# Patient Record
Sex: FEMALE | Race: BLACK OR AFRICAN AMERICAN | NOT HISPANIC OR LATINO | Employment: OTHER | URBAN - METROPOLITAN AREA
[De-identification: names, ages, dates, MRNs, and addresses within clinical notes are randomized per-mention and may not be internally consistent; named-entity substitution may affect disease eponyms.]

---

## 2018-02-11 PROBLEM — J18.9 PNEUMONIA DUE TO INFECTIOUS ORGANISM: Status: ACTIVE | Noted: 2018-02-11

## 2018-02-12 PROBLEM — E66.01 SEVERE OBESITY (BMI >= 40): Status: ACTIVE | Noted: 2018-02-12

## 2018-02-12 PROBLEM — G47.33 OBSTRUCTIVE SLEEP APNEA: Status: ACTIVE | Noted: 2018-02-12

## 2018-02-12 PROBLEM — J45.21 MILD INTERMITTENT ASTHMA WITH ACUTE EXACERBATION: Status: ACTIVE | Noted: 2018-02-12

## 2018-02-12 PROBLEM — I10 ESSENTIAL HYPERTENSION: Status: ACTIVE | Noted: 2018-02-12

## 2018-02-13 PROBLEM — J18.9 PNEUMONIA OF BOTH LUNGS DUE TO INFECTIOUS ORGANISM: Status: RESOLVED | Noted: 2018-02-11 | Resolved: 2018-02-13

## 2020-01-15 ENCOUNTER — NURSE TRIAGE (OUTPATIENT)
Dept: ADMINISTRATIVE | Facility: CLINIC | Age: 45
End: 2020-01-15

## 2020-01-15 NOTE — TELEPHONE ENCOUNTER
Pt calling with reports of new onset positional dyspnea, abnormal diaphoresis, and coughing. Denies CP. Per triage protocol, pt advised to go to ED now. 911 precautions given. Pt verbalized understanding.    Reason for Disposition   Patient sounds very sick or weak to the triager    Additional Information   Negative: Breathing stopped and hasn't returned   Negative: Choking on something   Negative: SEVERE difficulty breathing (e.g., struggling for each breath, speaks in single words, pulse > 120)   Negative: Bluish (or gray) lips or face   Negative: Difficult to awaken or acting confused (e.g., disoriented, slurred speech)   Negative: Passed out (i.e., fainted, collapsed and was not responding)   Negative: Wheezing started suddenly after medicine, an allergic food, or bee sting   Negative: Stridor   Negative: Slow, shallow and weak breathing   Negative: Sounds like a life-threatening emergency to the triager    Protocols used: BREATHING DIFFICULTY-A-OH

## 2020-01-28 ENCOUNTER — OFFICE VISIT (OUTPATIENT)
Dept: PRIMARY CARE CLINIC | Facility: CLINIC | Age: 45
End: 2020-01-28
Payer: MEDICAID

## 2020-01-28 ENCOUNTER — CLINICAL SUPPORT (OUTPATIENT)
Dept: PRIMARY CARE CLINIC | Facility: CLINIC | Age: 45
End: 2020-01-28
Payer: MEDICAID

## 2020-01-28 VITALS
BODY MASS INDEX: 53.92 KG/M2 | HEIGHT: 62 IN | DIASTOLIC BLOOD PRESSURE: 102 MMHG | HEART RATE: 82 BPM | WEIGHT: 293 LBS | OXYGEN SATURATION: 99 % | TEMPERATURE: 100 F | SYSTOLIC BLOOD PRESSURE: 150 MMHG | RESPIRATION RATE: 20 BRPM

## 2020-01-28 DIAGNOSIS — I10 ESSENTIAL HYPERTENSION: ICD-10-CM

## 2020-01-28 DIAGNOSIS — Z13.6 SCREENING FOR CARDIOVASCULAR CONDITION: ICD-10-CM

## 2020-01-28 DIAGNOSIS — F32.A DEPRESSION, UNSPECIFIED DEPRESSION TYPE: ICD-10-CM

## 2020-01-28 DIAGNOSIS — Z09 HOSPITAL DISCHARGE FOLLOW-UP: Primary | ICD-10-CM

## 2020-01-28 DIAGNOSIS — R35.0 FREQUENT URINATION: ICD-10-CM

## 2020-01-28 DIAGNOSIS — E66.01 MORBID OBESITY WITH BMI OF 60.0-69.9, ADULT: ICD-10-CM

## 2020-01-28 DIAGNOSIS — Z23 NEED FOR VACCINATION: ICD-10-CM

## 2020-01-28 DIAGNOSIS — G47.33 OSA (OBSTRUCTIVE SLEEP APNEA): ICD-10-CM

## 2020-01-28 DIAGNOSIS — Z21 ASYMPTOMATIC HIV INFECTION: ICD-10-CM

## 2020-01-28 DIAGNOSIS — Z12.31 BREAST CANCER SCREENING BY MAMMOGRAM: ICD-10-CM

## 2020-01-28 PROBLEM — B20 HIV (HUMAN IMMUNODEFICIENCY VIRUS INFECTION): Status: ACTIVE | Noted: 2020-01-28

## 2020-01-28 LAB
ALBUMIN SERPL BCP-MCNC: 3.5 G/DL (ref 3.5–5.2)
ALBUMIN/CREAT UR: 2126.7 UG/MG (ref 0–30)
ALP SERPL-CCNC: 58 U/L (ref 38–126)
ALT SERPL W/O P-5'-P-CCNC: 20 U/L (ref 14–54)
ANION GAP SERPL CALC-SCNC: 10 MMOL/L (ref 8–16)
AST SERPL-CCNC: 22 U/L (ref 15–41)
BACTERIA #/AREA URNS HPF: ABNORMAL /HPF
BILIRUB SERPL-MCNC: 0.4 MG/DL (ref 0.3–1.2)
BILIRUB UR QL STRIP: NEGATIVE
BUN SERPL-MCNC: 18 MG/DL (ref 6–20)
CALCIUM SERPL-MCNC: 9 MG/DL (ref 8.6–10)
CHLORIDE SERPL-SCNC: 101 MMOL/L (ref 101–111)
CHOLEST SERPL-MCNC: 222 MG/DL (ref 80–200)
CHOLEST/HDLC SERPL: 5.3 {RATIO} (ref 2–5)
CLARITY UR: CLEAR
CO2 SERPL-SCNC: 26 MMOL/L (ref 23–29)
COLOR UR: YELLOW
CREAT SERPL-MCNC: 1.4 MG/DL (ref 0.5–1.4)
CREAT UR-MCNC: 116 MG/DL (ref 15–325)
EST. GFR  (AFRICAN AMERICAN): 52.3 ML/MIN/1.73 M^2
EST. GFR  (NON AFRICAN AMERICAN): 45.4 ML/MIN/1.73 M^2
ESTIMATED AVG GLUCOSE: 123 MG/DL (ref 68–131)
GLUCOSE SERPL-MCNC: 81 MG/DL (ref 74–118)
GLUCOSE UR QL STRIP: NEGATIVE
HBA1C MFR BLD HPLC: 5.9 % (ref 4–5.6)
HDLC SERPL-MCNC: 42 MG/DL (ref 40–75)
HDLC SERPL: 18.9 % (ref 20–50)
HGB UR QL STRIP: ABNORMAL
HYALINE CASTS #/AREA URNS LPF: 0 /LPF
KETONES UR QL STRIP: NEGATIVE
LDLC SERPL CALC-MCNC: 138 MG/DL
LEUKOCYTE ESTERASE UR QL STRIP: NEGATIVE
MICROALBUMIN UR DL<=1MG/L-MCNC: 2467 UG/ML
MICROSCOPIC COMMENT: ABNORMAL
NITRITE UR QL STRIP: NEGATIVE
NONHDLC SERPL-MCNC: 180 MG/DL
PH UR STRIP: 6 [PH] (ref 5–8)
POTASSIUM SERPL-SCNC: 4 MMOL/L (ref 3.5–5.1)
PROT SERPL-MCNC: 8.1 G/DL (ref 6–8.4)
PROT UR QL STRIP: ABNORMAL
RBC #/AREA URNS HPF: 0 /HPF (ref 0–4)
SODIUM SERPL-SCNC: 137 MMOL/L (ref 136–145)
SP GR UR STRIP: 1.02 (ref 1–1.03)
SQUAMOUS #/AREA URNS HPF: 2 /HPF
TRIGL SERPL-MCNC: 212 MG/DL (ref 30–150)
URN SPEC COLLECT METH UR: ABNORMAL
UROBILINOGEN UR STRIP-ACNC: NEGATIVE EU/DL
WBC #/AREA URNS HPF: 6 /HPF (ref 0–5)

## 2020-01-28 PROCEDURE — 99999 PR PBB SHADOW E&M-EST. PATIENT-LVL IV: ICD-10-PCS | Mod: PBBFAC,,, | Performed by: FAMILY MEDICINE

## 2020-01-28 PROCEDURE — 80053 COMPREHEN METABOLIC PANEL: CPT

## 2020-01-28 PROCEDURE — 99204 PR OFFICE/OUTPT VISIT, NEW, LEVL IV, 45-59 MIN: ICD-10-PCS | Mod: S$PBB,25,, | Performed by: FAMILY MEDICINE

## 2020-01-28 PROCEDURE — 81001 URINALYSIS AUTO W/SCOPE: CPT

## 2020-01-28 PROCEDURE — 86704 HEP B CORE ANTIBODY TOTAL: CPT

## 2020-01-28 PROCEDURE — 82043 UR ALBUMIN QUANTITATIVE: CPT

## 2020-01-28 PROCEDURE — 80074 ACUTE HEPATITIS PANEL: CPT

## 2020-01-28 PROCEDURE — 86592 SYPHILIS TEST NON-TREP QUAL: CPT

## 2020-01-28 PROCEDURE — 83036 HEMOGLOBIN GLYCOSYLATED A1C: CPT

## 2020-01-28 PROCEDURE — 36415 COLL VENOUS BLD VENIPUNCTURE: CPT | Mod: PBBFAC,PN

## 2020-01-28 PROCEDURE — 36415 COLL VENOUS BLD VENIPUNCTURE: CPT

## 2020-01-28 PROCEDURE — 87491 CHLMYD TRACH DNA AMP PROBE: CPT

## 2020-01-28 PROCEDURE — 99214 OFFICE O/P EST MOD 30 MIN: CPT | Mod: PBBFAC,PN,25 | Performed by: FAMILY MEDICINE

## 2020-01-28 PROCEDURE — 90471 IMMUNIZATION ADMIN: CPT | Mod: PBBFAC,PN

## 2020-01-28 PROCEDURE — 80061 LIPID PANEL: CPT

## 2020-01-28 PROCEDURE — 99204 OFFICE O/P NEW MOD 45 MIN: CPT | Mod: S$PBB,25,, | Performed by: FAMILY MEDICINE

## 2020-01-28 PROCEDURE — 99999 PR PBB SHADOW E&M-EST. PATIENT-LVL IV: CPT | Mod: PBBFAC,,, | Performed by: FAMILY MEDICINE

## 2020-01-28 RX ORDER — SERTRALINE HYDROCHLORIDE 50 MG/1
50 TABLET, FILM COATED ORAL DAILY
Qty: 30 TABLET | Refills: 2 | Status: SHIPPED | OUTPATIENT
Start: 2020-01-28 | End: 2020-02-05 | Stop reason: SDUPTHER

## 2020-01-28 RX ORDER — LISINOPRIL 20 MG/1
20 TABLET ORAL DAILY
Qty: 30 TABLET | Refills: 2 | Status: SHIPPED | OUTPATIENT
Start: 2020-01-28 | End: 2020-02-27

## 2020-01-28 NOTE — PROGRESS NOTES
Patient identified by name and date of birth, denies any allergies, immunization administered as ordered by aseptic technique, tolerated well by pt.

## 2020-01-28 NOTE — PROGRESS NOTES
"Subjective:       Patient ID: Celine Peñaloza is a 44 y.o. female.    Chief Complaint: Hospital Follow Up (ER visit a couple weeks ago, told had pneumonia, here for f/u and to establish care)    44 yr old with HIV, HTN, GANGA(not using CPAP), morbid obesity depression. Here to establish care as needs a pcp. Goes to [facility] for HIV care last labs done in 11/2019 and undetectable per pt. Disabled and lives with mother who she is primary caretaker of along with two grandchildren.   Was taking lisinopril but did not have anyone to fill it so has not taken it in some time.     Review of Systems   Constitutional: Negative for activity change, chills and fever.   Respiratory: Negative for cough, chest tightness, shortness of breath and wheezing.    Cardiovascular: Negative for chest pain, palpitations and leg swelling.   Gastrointestinal: Negative for abdominal distention, abdominal pain, constipation, diarrhea, nausea and vomiting.   Genitourinary: Negative for difficulty urinating and dysuria.   Skin: Negative for rash.   Neurological: Negative for weakness.   Hematological: Does not bruise/bleed easily.   Psychiatric/Behavioral: Negative for agitation. The patient is nervous/anxious.        Objective:      Vitals:    01/28/20 0914   BP: (!) 150/102   BP Location: Right arm   Patient Position: Sitting   BP Method: Thigh Cuff (Manual)   Pulse: 82   Resp: 20   Temp: 99.7 °F (37.6 °C)   TempSrc: Oral   SpO2: 99%   Weight: (!) 166.9 kg (367 lb 15.2 oz)   Height: 5' 2" (1.575 m)     Physical Exam   Constitutional: She appears well-developed and well-nourished.   HENT:   Head: Normocephalic and atraumatic.   Nose: Nose normal.   Mouth/Throat: Oropharynx is clear and moist.   Eyes: Conjunctivae and EOM are normal.   Cardiovascular: Normal rate, regular rhythm and normal heart sounds.   Pulmonary/Chest: Effort normal and breath sounds normal. No respiratory distress. She has no wheezes. She has no rales.   Abdominal: Soft. She " exhibits no distension. There is no tenderness.   Lymphadenopathy:     She has no cervical adenopathy.   Neurological: She is alert. No cranial nerve deficit.   Psychiatric: She has a normal mood and affect.   Nursing note and vitals reviewed.          Lab Results   Component Value Date     (L) 01/15/2020    K 3.8 01/15/2020     01/15/2020    CO2 24 01/15/2020    BUN 24 (H) 01/15/2020    CREATININE 1.7 (H) 01/15/2020    ANIONGAP 10 01/15/2020     Lab Results   Component Value Date    HGBA1C 5.6 02/13/2018     Lab Results   Component Value Date    BNP 32 01/15/2020     (H) 09/07/2019     (H) 02/11/2018       Lab Results   Component Value Date    WBC 13.50 (H) 01/15/2020    HGB 12.0 01/15/2020    HCT 36.2 (L) 01/15/2020    HCT 36 02/11/2018     01/15/2020    GRAN 8.9 (H) 01/15/2020    GRAN 66.1 01/15/2020     No results found for: CHOL, HDL, LDLCALC, TRIG       Current Outpatient Medications:     dolutegravir (TIVICAY) 50 mg Tab, Take 50 mg by mouth., Disp: , Rfl:     emtricitabine-tenofovir alafen (DESCOVY) 200-25 mg Tab, Take 1 tablet by mouth., Disp: , Rfl:     albuterol (PROVENTIL/VENTOLIN HFA) 90 mcg/actuation inhaler, Inhale 1-2 puffs into the lungs every 6 (six) hours as needed for Wheezing. Rescue (Patient not taking: Reported on 1/28/2020), Disp: 1 Inhaler, Rfl: 0    lisinopril (PRINIVIL,ZESTRIL) 20 MG tablet, Take 1 tablet (20 mg total) by mouth once daily., Disp: 30 tablet, Rfl: 2    sertraline (ZOLOFT) 50 MG tablet, Take 1 tablet (50 mg total) by mouth once daily., Disp: 30 tablet, Rfl: 2        Assessment:       1. Hospital discharge follow-up    2. Essential hypertension    3. Asymptomatic HIV infection    4. Morbid obesity with BMI of 60.0-69.9, adult    5. Frequent urination    6. GANGA (obstructive sleep apnea)    7. Screening for cardiovascular condition    8. Need for vaccination    9. Breast cancer screening by mammogram    10. Depression, unspecified depression  type           Plan:       Essential hypertension  -     lisinopril (PRINIVIL,ZESTRIL) 20 MG tablet; Take 1 tablet (20 mg total) by mouth once daily.  Dispense: 30 tablet; Refill: 2  -     Comprehensive metabolic panel; Future; Expected date: 01/28/2020  -     Microalbumin/creatinine urine ratio  Uncontrolled 150/102. Denies SOB, CP, HA, or vision changes. Previously controlled on lisinopril 20 mg but not taking now. Refill today.   F/u in 1 week.     Asymptomatic HIV infection  -     C. trachomatis/N. gonorrhoeae by AMP DNA [facility]; Urine  -     Hepatitis B core antibody, total; Future; Expected date: 01/28/2020  -     Hepatitis B surface antigen; Future; Expected date: 01/28/2020  -     RPR; Future; Expected date: 01/28/2020  -     Hepatitis panel, acute; Future; Expected date: 01/28/2020  Getting HIV [facility] at [facility]. Requesting med records. Per pt undetectable.     Morbid obesity with BMI of 60.0-69.9, adult  -     Ambulatory Referral to Diabetes Education; Future; Expected date: 01/28/2020  Reviewed diet and lifestyle changes. Discussed target goals, including cutting out sugar beverages and unhealthy snacks. Planning for healthy mealy prep. Goal of exercising at least 20 min a day. Vigorous exercise. Referred to nutrition.   Frequent urination  -     Urinalysis, Reflex to Urine Culture Urine, Clean Catch    GANGA (obstructive sleep apnea)  -     Polysomnography 4 or more parameters; Future  Previously diagnosed but not using CPAP. Old testing was 10 yrs ago and unsure of where. Also needs new equipment.     Screening for cardiovascular condition  -     Hemoglobin A1c; Future; Expected date: 01/28/2020  -     Lipid panel; Future; Expected date: 01/28/2020    Need for vaccination  -     Tdap Vaccine    Breast cancer screening by mammogram  -     Mammo Digital Screening Bilat without CA; Future; Expected date: 01/28/2020  Low risk. Referral per pt request. Had previous test 3 yrs ago which was normal per pt.        Depression: Long standing. Ups and downs. Trial of sertraline 50 mg qhs.

## 2020-01-29 LAB
C TRACH DNA SPEC QL NAA+PROBE: NOT DETECTED
HAV IGM SERPL QL IA: NEGATIVE
HBV CORE AB SERPL QL IA: NEGATIVE
HBV CORE IGM SERPL QL IA: NEGATIVE
HBV SURFACE AG SERPL QL IA: NEGATIVE
HBV SURFACE AG SERPL QL IA: NEGATIVE
HCV AB SERPL QL IA: POSITIVE
N GONORRHOEA DNA SPEC QL NAA+PROBE: NOT DETECTED
RPR SER QL: NORMAL

## 2020-01-30 ENCOUNTER — TELEPHONE (OUTPATIENT)
Dept: PRIMARY CARE CLINIC | Facility: CLINIC | Age: 45
End: 2020-01-30

## 2020-01-30 NOTE — TELEPHONE ENCOUNTER
Unable to reach patient about Hepatitis C antibody positive results. Left message about upcoming appointment and need to discuss lab results. To call clinic with questions.

## 2020-02-02 ENCOUNTER — PATIENT OUTREACH (OUTPATIENT)
Dept: ADMINISTRATIVE | Facility: OTHER | Age: 45
End: 2020-02-02

## 2020-02-02 ENCOUNTER — TELEPHONE (OUTPATIENT)
Dept: PRIMARY CARE CLINIC | Facility: CLINIC | Age: 45
End: 2020-02-02

## 2020-02-02 DIAGNOSIS — R76.8 HEPATITIS C ANTIBODY POSITIVE IN BLOOD: Primary | ICD-10-CM

## 2020-02-02 NOTE — TELEPHONE ENCOUNTER
Called patient about lab results, including her positive hepatitis C antibody and the need for further testing. She is feeling well currently and advised to make her upcoming appointment for more testing. She says she plans to come in and grateful for the call.

## 2020-02-03 ENCOUNTER — CLINICAL SUPPORT (OUTPATIENT)
Dept: DIABETES | Facility: CLINIC | Age: 45
End: 2020-02-03
Payer: MEDICAID

## 2020-02-03 DIAGNOSIS — E66.01 MORBID OBESITY WITH BMI OF 60.0-69.9, ADULT: ICD-10-CM

## 2020-02-03 PROCEDURE — 99999 PR PBB SHADOW E&M-EST. PATIENT-LVL II: ICD-10-PCS | Mod: PBBFAC,,, | Performed by: DIETITIAN, REGISTERED

## 2020-02-03 PROCEDURE — 97802 MEDICAL NUTRITION INDIV IN: CPT | Mod: PBBFAC,PN | Performed by: DIETITIAN, REGISTERED

## 2020-02-03 PROCEDURE — 99212 OFFICE O/P EST SF 10 MIN: CPT | Mod: PBBFAC,PN | Performed by: DIETITIAN, REGISTERED

## 2020-02-03 PROCEDURE — 99999 PR PBB SHADOW E&M-EST. PATIENT-LVL II: CPT | Mod: PBBFAC,,, | Performed by: DIETITIAN, REGISTERED

## 2020-02-03 NOTE — PROGRESS NOTES
PCP: Jamarcus Levy MD  REFERRING PROVIDER:  Jamarcus Levy MD      HISTORY OF PRESENT ILLNESS:  44 y.o. female patient is in clinic today for bariatric surgery assessment. Patient has 6 month(s) of MSD.     Patient denies polydipsia, polyuria, polyphagia, blurred vision, nausea, vomiting, diarrhea and hypoglycemia. Weight difficulties for 10 years.  Weight loss strategies include dieting.    VITAL SIGNS:  There were no vitals filed for this visit.  IBW: 110 lbs lbs +/-10% and AdjIBW: 174.5 lbs/79.3kg    ALLERGIES & MEDICATIONS: Reviewed and Reconciled  MEDICAL/SURGICAL & FAMILY HISTORY: Reviewed and Reconciled    LABORATORY:  A1C:   Hemoglobin A1C   Date Value Ref Range Status   01/28/2020 5.9 (H) 4.0 - 5.6 % Final     Comment:     ADA Screening Guidelines:  5.7-6.4%  Consistent with prediabetes  >or=6.5%  Consistent with diabetes  High levels of fetal hemoglobin interfere with the HbA1C  assay. Heterozygous hemoglobin variants (HbS, HgC, etc)do  not significantly interfere with this assay.   However, presence of multiple variants may affect accuracy.       Chol:   Cholesterol   Date Value Ref Range Status   01/28/2020 222 (H) 80 - 200 mg/dL Final     Comment:     The National Cholesterol Education Program (NCEP) has set the  following guidelines (reference ranges) for Cholesterol:  Optimal.....................<200 mg/dL  Borderline High.............200-239 mg/dL  High........................> or = 240 mg/dL       Trig:   Triglycerides   Date Value Ref Range Status   01/28/2020 212 (H) 30 - 150 mg/dL Final     Comment:     The National Cholesterol Education Program (NCEP) has set the  following guidelines (reference values) for triglycerides:  Normal......................<150 mg/dL  Borderline High.............150-199 mg/dL  High........................200-499 mg/dL       HDL:   HDL   Date Value Ref Range Status   01/28/2020 42 40 - 75 mg/dL Final     Comment:     The National Cholesterol Education  Program (NCEP) has set the  following guidelines (reference values) for HDL Cholesterol:  Low...............<40 mg/dL  Optimal...........>60 mg/dL       LDL: No components found for: LDL   BUN:      BUN, Bld   Date Value Ref Range Status   01/28/2020 18 6 - 20 mg/dL Final     AST:    AST   Date Value Ref Range Status   01/28/2020 22 15 - 41 U/L Final         ALT:    ALT   Date Value Ref Range Status   01/28/2020 20 14 - 54 U/L Final     Micro/Cr Ratio:    Creatinine   Date Value Ref Range Status   01/28/2020 1.4 0.5 - 1.4 mg/dL Final       ACTIVITY LEVEL: Aerobic none. Resistance none.    NUTRITION INTAKE: Meal patterns include 3 meals, 1-2 snacks daily with intake 2000 cals/d. Patient is not limiting carbohydrates, saturated fat or sodium. Inadequate intakes of fruits, vegetables, whole grains.  B - 2 boiled eggs and gatorade zero  S - none  L - 2 boiled eggs  S - none  D - grilled chicken  S - none  Beverages - water and Gatorade zero  DIning out - rarely    PSYCHOSOCIAL: Stage of change - preparation  Barriers to change - none  Motivation to change (10high): 10  Predicted compliance (10high): 10  Realistic expectation for wt loss (10high): 10  Verbalizes understanding of dietary changes post procedure and willingness to participate in physical activity (10high): 10    MNT ASSESSMENT:   1700 calories, 60-75 grams protein daily  30 grams carb/meal, 15 grams carb/snack  increase fruit 2 serv/d, vegetables 2+ cups/d, whole grains 3+serv/d, lowfat dairy 3+serv/d  low-fat, low-sodium  150 min physical activity per week, moderate intensity, as tolerated    Nutrition Diagnosis:  Nutrition Problem  Obesity Class III    Related to (etiology):   Excessive energy intake  Sedentary Lifestyle    Signs and Symptoms (as evidenced by):   Current BMI 67.3  Patient reports weight loss with current meal plan, was >400# now weighing 166.9kg     Interventions/Recommendations (treatment strategy):  Nutrition counseling on weight loss    Nutrition education to build basic and essential nutrition related knowledge of obesity and weight loss    Nutrition Diagnosis Status:   New    .Education provided:   Discussed protein sources like eggs, low-fat cottage cheese, greek yogurt, sliced deli turkey, low-fat sliced cheese, protein drinks and protein bars, foods to avoid/limit such as starchy carbohydrates (bread, rice, pasta, potatoes, corn, grits, oatmeal, etc), planning to have 4-6 small meals a day rather than 3 large meals, measure portion sizes, use smaller bowls and plates, limit eating out to once a week and make low fat, low carbohydrate choices, include fruits and vegetables in daily meal plan, avoid/limit candy and desserts, low fat options (baked, broiled, grilled, and boiled instead of fried, sauteed, creamed), increase physical activity, chew food thoroughly before swallowing, sip beverages don't chug or gulp, no liquids 30 minutes before, during and 30 minutes after meals  Reviewed pre-op liquid diet, protein supplement suggestions, sugar free clear liquids allowed in unlimited amounts, progression of diet after Bariatric surgery, Phase 1 liquid (high protein liquids for 1-2 weeks after surgery), phase 2 pureed (for the next 2 weeks phase 1 items and add pureed foods and soft scrambled eggs, always focus on getting protein in first), phase 3 soft (all the items from phases 1 and 2 add soft foods that can easily be mashed with a fork, add cooked vegetables and fruits that are soft, no skins) and phase 4 solid (add back one food item at a time, focus on getting protein in first, can add back raw vegetables, lettuce, unsalted nuts and seeds, limit fruit to no more than 2 servings a day, and protein bars), reviewed life long nutrition guidelines like eating slowly, eat and drink small amounts at a time, stop eating and drinking when you feel full, chew food thoroughly before swallowing, drink adequate fluids, eat protein rich foods first, keep  food choices sugar free and low in fat, avoid starchy carbohydrates, reviewed common nutritional problem and prevention tips, encouraged physical activity with permission from physician, reviewed required vitamin/mineral supplements and where to purchase, resources for bariatric patients and helpful apps for bariatric patients, and ten tips for healthy and conscious eati ng.   · .Reviewed Nutrition Before and After Surgery materials  · Reviewed Pre-op Liquid Protein Diet  · Reviewed protein supplement suggestions and where to purchase them  · Reviewed dietary progression after bariatric surgery  · Bariatric high protein liquid diet  · Bariatric high protein pureed diet  · Bariatric high protein soft diet  · Regular Bariatric diet  · Reviewed lifelong nutrition guidelines after weight loss surgery  · Reviewed common nutritional problems and prevention tips  · Reviewed physical activity recommendations  · Reviewed required vitamin/mineral supplements  · Reviewed resources for bariatric patients and helpful apps  · Reviewed tips for healthy and conscious eating  · Reviewed and patient verbalized understanding of and signed bariatric nutrition core points and contract agreement  · Patient completed [facility] surgical weight loss program nutrition and eating habits questionnaire    PLAN:    Reviewed MNT guidelines for obesity and weight management.   Encouraged daily self-monitoring of food & activity patterns.    Provided pre & post surgery meal-planning instruction via bariatric diet manual, foodlists, plate method, food models, food labels and/or ADA booklet. Reviewed micro/macronutrient effect on weight management. Discussed carbohydrate counting and spacing techniques with emphasis on supplementation necessary for altered metabolism. Reviewed principles of energy metabolism to assist weight and health management.                                                          Discussed physical activity with review of  benefits, methods, precautions.    Discussed bx strategies for improving, strategies for improving social & environmental support of lifestyle changes.     GOALS: Self monitoring: daily food & activity journal. Meal plan-90% accuracy, Physical activity-150 minutes per week.   FU: 1 month if MSD or 1 week prior to surgery.    Visit Time Spent:  60 minutes    Thank you for the opportunity to work with your patient.

## 2020-02-05 ENCOUNTER — CLINICAL SUPPORT (OUTPATIENT)
Dept: PRIMARY CARE CLINIC | Facility: CLINIC | Age: 45
End: 2020-02-05
Payer: MEDICAID

## 2020-02-05 ENCOUNTER — OFFICE VISIT (OUTPATIENT)
Dept: PRIMARY CARE CLINIC | Facility: CLINIC | Age: 45
End: 2020-02-05
Payer: MEDICAID

## 2020-02-05 VITALS
SYSTOLIC BLOOD PRESSURE: 134 MMHG | RESPIRATION RATE: 20 BRPM | HEART RATE: 100 BPM | OXYGEN SATURATION: 98 % | BODY MASS INDEX: 53.92 KG/M2 | DIASTOLIC BLOOD PRESSURE: 82 MMHG | HEIGHT: 62 IN | WEIGHT: 293 LBS | TEMPERATURE: 100 F

## 2020-02-05 DIAGNOSIS — F32.A DEPRESSION, UNSPECIFIED DEPRESSION TYPE: Primary | ICD-10-CM

## 2020-02-05 DIAGNOSIS — R76.8 HEPATITIS C ANTIBODY POSITIVE IN BLOOD: ICD-10-CM

## 2020-02-05 DIAGNOSIS — J45.20 MILD INTERMITTENT ASTHMA, UNSPECIFIED WHETHER COMPLICATED: ICD-10-CM

## 2020-02-05 DIAGNOSIS — I10 ESSENTIAL HYPERTENSION: ICD-10-CM

## 2020-02-05 PROCEDURE — 99214 PR OFFICE/OUTPT VISIT, EST, LEVL IV, 30-39 MIN: ICD-10-PCS | Mod: S$PBB,,, | Performed by: FAMILY MEDICINE

## 2020-02-05 PROCEDURE — 36415 COLL VENOUS BLD VENIPUNCTURE: CPT | Mod: PBBFAC,PN

## 2020-02-05 PROCEDURE — 99999 PR PBB SHADOW E&M-EST. PATIENT-LVL IV: CPT | Mod: PBBFAC,,, | Performed by: FAMILY MEDICINE

## 2020-02-05 PROCEDURE — 99214 OFFICE O/P EST MOD 30 MIN: CPT | Mod: S$PBB,,, | Performed by: FAMILY MEDICINE

## 2020-02-05 PROCEDURE — 99999 PR PBB SHADOW E&M-EST. PATIENT-LVL IV: ICD-10-PCS | Mod: PBBFAC,,, | Performed by: FAMILY MEDICINE

## 2020-02-05 PROCEDURE — 99214 OFFICE O/P EST MOD 30 MIN: CPT | Mod: PBBFAC,PN | Performed by: FAMILY MEDICINE

## 2020-02-05 RX ORDER — ALBUTEROL SULFATE 90 UG/1
1-2 AEROSOL, METERED RESPIRATORY (INHALATION) EVERY 4 HOURS PRN
Qty: 8 G | Refills: 2 | Status: SHIPPED | OUTPATIENT
Start: 2020-02-05 | End: 2021-03-02 | Stop reason: SDUPTHER

## 2020-02-05 RX ORDER — SERTRALINE HYDROCHLORIDE 50 MG/1
100 TABLET, FILM COATED ORAL NIGHTLY
Qty: 30 TABLET | Refills: 2 | Status: SHIPPED | OUTPATIENT
Start: 2020-02-05 | End: 2022-08-22

## 2020-02-05 NOTE — PROGRESS NOTES
"Subjective:       Patient ID: Celine Peñaloza is a 44 y.o. female.    Chief Complaint: Follow-up    Here for follow up and further Hep C testing from her positive antibody test. Cannot say where she got this from and denies any IV drug use.   Denies N/V, abd pain.   States she shortly has an interview at [facility] for barriatric surgery which she is excited about.    Review of Systems   Constitutional: Negative for activity change, chills and fever.   Respiratory: Positive for cough. Negative for shortness of breath.    Cardiovascular: Negative for chest pain and leg swelling.   Gastrointestinal: Negative for abdominal distention and abdominal pain.   Skin: Negative for rash.   Neurological: Negative for weakness.   Psychiatric/Behavioral: Negative for agitation. The patient is not nervous/anxious.        Objective:      Vitals:    02/05/20 1529   BP: 134/82   BP Location: Left arm   Patient Position: Sitting   BP Method: Thigh Cuff (Manual)   Pulse: 100   Resp: 20   Temp: 99.5 °F (37.5 °C)   TempSrc: Oral   SpO2: 98%   Weight: (!) 165.2 kg (364 lb 1.4 oz)   Height: 5' 2" (1.575 m)     Physical Exam   Constitutional: She appears well-developed and well-nourished.   HENT:   Head: Normocephalic and atraumatic.   Nose: Nose normal.   Mouth/Throat: Oropharynx is clear and moist.   Eyes: Conjunctivae and EOM are normal.   Cardiovascular: Normal rate, regular rhythm and normal heart sounds.   Pulmonary/Chest: Effort normal and breath sounds normal. No respiratory distress. She has no wheezes. She has no rales.   Abdominal: Soft. She exhibits no distension. There is no tenderness.   Lymphadenopathy:     She has no cervical adenopathy.   Neurological: She is alert. No cranial nerve deficit.   Psychiatric: She has a normal mood and affect.   Nursing note and vitals reviewed.          Lab Results   Component Value Date     01/28/2020    K 4.0 01/28/2020     01/28/2020    CO2 26 01/28/2020    BUN 18 01/28/2020    " CREATININE 1.4 01/28/2020    ANIONGAP 10 01/28/2020     Lab Results   Component Value Date    HGBA1C 5.9 (H) 01/28/2020     Lab Results   Component Value Date    BNP 32 01/15/2020     (H) 09/07/2019     (H) 02/11/2018       Lab Results   Component Value Date    WBC 13.50 (H) 01/15/2020    HGB 12.0 01/15/2020    HCT 36.2 (L) 01/15/2020    HCT 36 02/11/2018     01/15/2020    GRAN 8.9 (H) 01/15/2020    GRAN 66.1 01/15/2020     Lab Results   Component Value Date    CHOL 222 (H) 01/28/2020    HDL 42 01/28/2020    LDLCALC 138 (H) 01/28/2020    TRIG 212 (H) 01/28/2020          Current Outpatient Medications:     albuterol (PROVENTIL/VENTOLIN HFA) 90 mcg/actuation inhaler, Inhale 1-2 puffs into the lungs every 4 (four) hours as needed for Wheezing or Shortness of Breath. Rescue, Disp: 8 g, Rfl: 2    dolutegravir (TIVICAY) 50 mg Tab, Take 50 mg by mouth., Disp: , Rfl:     emtricitabine-tenofovir alafen (DESCOVY) 200-25 mg Tab, Take 1 tablet by mouth., Disp: , Rfl:     lisinopril (PRINIVIL,ZESTRIL) 20 MG tablet, Take 1 tablet (20 mg total) by mouth once daily., Disp: 30 tablet, Rfl: 2    sertraline (ZOLOFT) 50 MG tablet, Take 2 tablets (100 mg total) by mouth every evening., Disp: 30 tablet, Rfl: 2        Assessment:       1. Depression, unspecified depression type    2. Essential hypertension    3. Hepatitis C antibody positive in blood    4. Dyspnea, unspecified type    5. Mild intermittent asthma, unspecified whether complicated           Plan:       Depression, unspecified depression type  -     sertraline (ZOLOFT) 50 MG tablet; Take 2 tablets (100 mg total) by mouth every evening.  Dispense: 30 tablet; Refill: 2  Improved on sertraline. Sleeping much better. Desiring to increase which is appropriate. Changing from 50 to 100 mg daily. F/u in 4-6 weeks    Essential hypertension  Well controlled on lisinopril 20 mg    Hepatitis C antibody positive in blood  Verifying today. No hx of iv drug use per  pt      Mild intermittent asthma, unspecified whether complicated  -     albuterol (PROVENTIL/VENTOLIN HFA) 90 mcg/actuation inhaler; Inhale 1-2 puffs into the lungs every 4 (four) hours as needed for Wheezing or Shortness of Breath. Rescue  Dispense: 8 g; Refill: 2  Coughing more lately. Lungs clear. Refill on inhaler which she is not taking currently.

## 2020-02-07 ENCOUNTER — TELEPHONE (OUTPATIENT)
Dept: PRIMARY CARE CLINIC | Facility: CLINIC | Age: 45
End: 2020-02-07

## 2020-02-07 LAB
HCV GENTYP SERPL NAA+PROBE: NORMAL
HCV RNA SERPL NAA+PROBE-LOG IU: <1.08 LOG (10) IU/ML
HCV RNA SERPL QL NAA+PROBE: NOT DETECTED
HCV RNA SPEC NAA+PROBE-ACNC: <12 IU/ML

## 2020-02-07 NOTE — TELEPHONE ENCOUNTER
Spoke to patient about negative Hep C viral load. Either her immune system cleared the virus or the initial test was a lab error. She was grateful to hear the news and for the phone call.

## 2020-02-12 ENCOUNTER — TELEPHONE (OUTPATIENT)
Dept: SLEEP MEDICINE | Facility: OTHER | Age: 45
End: 2020-02-12

## 2020-02-18 ENCOUNTER — TELEPHONE (OUTPATIENT)
Dept: SLEEP MEDICINE | Facility: OTHER | Age: 45
End: 2020-02-18

## 2020-02-26 ENCOUNTER — PATIENT OUTREACH (OUTPATIENT)
Dept: ADMINISTRATIVE | Facility: OTHER | Age: 45
End: 2020-02-26

## 2020-03-02 ENCOUNTER — TELEPHONE (OUTPATIENT)
Dept: DIABETES | Facility: CLINIC | Age: 45
End: 2020-03-02

## 2020-03-02 NOTE — TELEPHONE ENCOUNTER
.Phoned patient to see why appointment was missed and to reschedule if possible - no answer, left voicemail

## 2020-03-03 ENCOUNTER — TELEPHONE (OUTPATIENT)
Dept: SLEEP MEDICINE | Facility: OTHER | Age: 45
End: 2020-03-03

## 2020-03-10 ENCOUNTER — TELEPHONE (OUTPATIENT)
Dept: SLEEP MEDICINE | Facility: OTHER | Age: 45
End: 2020-03-10

## 2020-03-24 ENCOUNTER — TELEPHONE (OUTPATIENT)
Dept: PRIMARY CARE CLINIC | Facility: CLINIC | Age: 45
End: 2020-03-24

## 2020-03-24 NOTE — TELEPHONE ENCOUNTER
Called patient about missed appointment today. Left message about need for follow up asap for her chronic medical problems. Happy to talk on the phone and given call back number.

## 2020-03-27 ENCOUNTER — TELEPHONE (OUTPATIENT)
Dept: SLEEP MEDICINE | Facility: OTHER | Age: 45
End: 2020-03-27

## 2020-04-14 ENCOUNTER — TELEPHONE (OUTPATIENT)
Dept: SLEEP MEDICINE | Facility: OTHER | Age: 45
End: 2020-04-14

## 2020-04-15 ENCOUNTER — TELEPHONE (OUTPATIENT)
Dept: SLEEP MEDICINE | Facility: OTHER | Age: 45
End: 2020-04-15

## 2020-04-16 ENCOUNTER — TELEPHONE (OUTPATIENT)
Dept: SLEEP MEDICINE | Facility: OTHER | Age: 45
End: 2020-04-16

## 2020-05-18 DIAGNOSIS — G47.33 OSA (OBSTRUCTIVE SLEEP APNEA): Primary | ICD-10-CM

## 2020-05-21 ENCOUNTER — PATIENT OUTREACH (OUTPATIENT)
Dept: ADMINISTRATIVE | Facility: HOSPITAL | Age: 45
End: 2020-05-21

## 2020-05-22 NOTE — PROGRESS NOTES
Immunizations reviewed. Legacy reviewed. Care Everywhere reviewed. DIS reviewed w/ no results yielded. Chart review completed.

## 2020-06-21 ENCOUNTER — NURSE TRIAGE (OUTPATIENT)
Dept: ADMINISTRATIVE | Facility: CLINIC | Age: 45
End: 2020-06-21

## 2020-06-21 NOTE — TELEPHONE ENCOUNTER
Reason for Disposition   [1] MODERATE-SEVERE hives persist (i.e., hives interfere with normal activities or work) AND [2] taking antihistamine (e.g., Benadryl, Claritin) > 24 hours    Additional Information   Negative: [1] Life-threatening reaction (anaphylaxis) in the past to similar substance (e.g., food, insect bite/sting, chemical, etc.) AND [2] < 2 hours since exposure   Negative: Difficulty breathing or wheezing now   Negative: [1] Swollen tongue AND [2] rapid onset   Negative: [1] Hoarseness or cough now AND [2] rapid onset   Negative: Shock suspected (e.g., cold/pale/clammy skin, too weak to stand, low BP, rapid pulse)   Negative: Difficult to awaken or acting confused (e.g., disoriented, slurred speech)   Negative: Sounds like a life-threatening emergency to the triager   Negative: Swollen tongue   Negative: [1] Widespread hives AND [2] onset < 2 hours of exposure to high-risk allergen (e.g., peanuts, tree nuts, fish or shellfish)   Negative: Patient sounds very sick or weak to the triager    Protocols used: HIVES-A-AH    Patient states all of a sudden she started itching and has a rash and hives in her face on buttocks and appearing everywhere. She states she will go to the [place] station to see if they have benadryl, and she was advised to go to the urgent care today. Patient verbalized understanding of care advice.

## 2020-12-11 ENCOUNTER — PATIENT MESSAGE (OUTPATIENT)
Dept: OTHER | Facility: OTHER | Age: 45
End: 2020-12-11

## 2021-04-05 ENCOUNTER — PATIENT MESSAGE (OUTPATIENT)
Dept: ADMINISTRATIVE | Facility: HOSPITAL | Age: 46
End: 2021-04-05

## 2021-04-26 ENCOUNTER — PATIENT MESSAGE (OUTPATIENT)
Dept: RESEARCH | Facility: HOSPITAL | Age: 46
End: 2021-04-26

## 2021-07-06 ENCOUNTER — PATIENT MESSAGE (OUTPATIENT)
Dept: ADMINISTRATIVE | Facility: HOSPITAL | Age: 46
End: 2021-07-06

## 2021-10-05 ENCOUNTER — PATIENT MESSAGE (OUTPATIENT)
Dept: ADMINISTRATIVE | Facility: HOSPITAL | Age: 46
End: 2021-10-05

## 2022-01-21 ENCOUNTER — PATIENT MESSAGE (OUTPATIENT)
Dept: ADMINISTRATIVE | Facility: HOSPITAL | Age: 47
End: 2022-01-21
Payer: MEDICAID

## 2022-02-17 ENCOUNTER — PATIENT MESSAGE (OUTPATIENT)
Dept: ADMINISTRATIVE | Facility: HOSPITAL | Age: 47
End: 2022-02-17
Payer: MEDICAID

## 2022-08-14 PROBLEM — J44.9 COPD (CHRONIC OBSTRUCTIVE PULMONARY DISEASE): Status: ACTIVE | Noted: 2022-08-14

## 2022-08-15 PROBLEM — R63.8 PREDICTED EXCESSIVE ENERGY INTAKE: Status: ACTIVE | Noted: 2022-08-15

## 2022-08-15 PROBLEM — R63.8 PREDICTED EXCESSIVE ENERGY INTAKE: Status: RESOLVED | Noted: 2022-08-15 | Resolved: 2022-08-15

## 2022-08-31 PROBLEM — N17.9 AKI (ACUTE KIDNEY INJURY): Status: ACTIVE | Noted: 2022-08-31

## 2022-08-31 PROBLEM — D72.829 LEUKOCYTOSIS: Status: ACTIVE | Noted: 2022-08-31

## 2022-08-31 PROBLEM — R06.02 SHORTNESS OF BREATH: Status: ACTIVE | Noted: 2022-08-31

## 2022-09-01 PROBLEM — E87.1 HYPONATREMIA: Status: ACTIVE | Noted: 2022-08-31

## 2022-09-01 PROBLEM — J18.9 COMMUNITY ACQUIRED PNEUMONIA: Status: ACTIVE | Noted: 2022-08-31

## 2022-09-02 PROBLEM — N18.9 CKD (CHRONIC KIDNEY DISEASE): Status: ACTIVE | Noted: 2022-09-02

## 2022-09-02 PROBLEM — E87.1 HYPONATREMIA: Status: RESOLVED | Noted: 2022-08-31 | Resolved: 2022-09-02

## 2022-12-05 PROBLEM — J18.9 COMMUNITY ACQUIRED PNEUMONIA: Status: RESOLVED | Noted: 2022-08-31 | Resolved: 2022-12-05

## 2022-12-05 PROBLEM — N17.9 ACUTE KIDNEY INJURY: Status: RESOLVED | Noted: 2022-08-31 | Resolved: 2022-12-05

## 2023-02-14 ENCOUNTER — TELEPHONE (OUTPATIENT)
Dept: OPHTHALMOLOGY | Facility: CLINIC | Age: 48
End: 2023-02-14
Payer: MEDICAID

## 2023-02-14 NOTE — TELEPHONE ENCOUNTER
----- Message from Rebecca Kline sent at 2/14/2023  8:09 AM CST -----  Contact: Celine @[phone]  Pt needs a call back regarding an ER fu appt. Please give her a call back to further assist

## 2023-02-15 ENCOUNTER — TELEPHONE (OUTPATIENT)
Dept: OPHTHALMOLOGY | Facility: CLINIC | Age: 48
End: 2023-02-15
Payer: MEDICAID

## 2023-02-16 ENCOUNTER — OFFICE VISIT (OUTPATIENT)
Dept: OPTOMETRY | Facility: CLINIC | Age: 48
End: 2023-02-16
Payer: MEDICAID

## 2023-02-16 DIAGNOSIS — B30.9 VIRAL CONJUNCTIVITIS OF RIGHT EYE: Primary | ICD-10-CM

## 2023-02-16 DIAGNOSIS — H40.051 OCULAR HYPERTENSION OF RIGHT EYE: ICD-10-CM

## 2023-02-16 PROCEDURE — 99999 PR PBB SHADOW E&M-EST. PATIENT-LVL III: ICD-10-PCS | Mod: PBBFAC,,, | Performed by: OPTOMETRIST

## 2023-02-16 PROCEDURE — 1159F MED LIST DOCD IN RCRD: CPT | Mod: CPTII,,, | Performed by: OPTOMETRIST

## 2023-02-16 PROCEDURE — 1159F PR MEDICATION LIST DOCUMENTED IN MEDICAL RECORD: ICD-10-PCS | Mod: CPTII,,, | Performed by: OPTOMETRIST

## 2023-02-16 PROCEDURE — 99999 PR PBB SHADOW E&M-EST. PATIENT-LVL III: CPT | Mod: PBBFAC,,, | Performed by: OPTOMETRIST

## 2023-02-16 PROCEDURE — 92002 PR EYE EXAM, NEW PATIENT,INTERMED: ICD-10-PCS | Mod: S$PBB,,, | Performed by: OPTOMETRIST

## 2023-02-16 PROCEDURE — 92002 INTRM OPH EXAM NEW PATIENT: CPT | Mod: S$PBB,,, | Performed by: OPTOMETRIST

## 2023-02-16 PROCEDURE — 99213 OFFICE O/P EST LOW 20 MIN: CPT | Mod: PBBFAC | Performed by: OPTOMETRIST

## 2023-02-16 RX ORDER — PREDNISOLONE ACETATE 10 MG/ML
1 SUSPENSION/ DROPS OPHTHALMIC 3 TIMES DAILY
Qty: 1 EACH | Refills: 0 | Status: SHIPPED | OUTPATIENT
Start: 2023-02-16 | End: 2023-03-02

## 2023-02-16 NOTE — PROGRESS NOTES
HPI    Celine Jh is here today for concerns about ocular health. Patient   reported to the ER 2/14/2023 due to pain/discomfort, redness and   irritation OU OD>OS. States that she has been experiencing these symptoms   for about two weeks but reports that it feels as is OD is getting   progressively worst. States that she experiences heavy tearing which   impacts her driving.  (+)Flashes OD Occasionally (+)Floaters OD Occasionally (+)Diplopia   Occasionally    (-)Headaches (+)Itching OD (+)Tearing OD (-) Burning (+)Dryness OU   (+) Photophobia depending on the lighting in the area she is in and also   while driving at night  (+)Glare  Past Eye Sx: No past ocular Sx  Eye Meds: Erythromycin 2x per day OD  Last edited by Rosmery Gotti, OD on 2/16/2023 10:50 AM.            Assessment /Plan     For exam results, see Encounter Report.    Viral conjunctivitis of right eye  -     prednisoLONE acetate (PRED FORTE) 1 % DrpS; Place 1 drop into the right eye 3 (three) times daily. for 14 days  Dispense: 1 each; Refill: 0    Ocular hypertension of right eye      1-2. Educated pt on today's findings. Pt to d/c previously prescribed Abx gtts Rx'd by urgent care/ER. Pt to begin Pred Ace tid OD and Travatan Z qhs OD until follow up. IOP likely elevated from viral infection-- cornea and A/C clear today. Will call to check on pt before next visit scheduled in 2 weeks.    RTC x 2 weeks for follow up or sooner if symptoms persist or worsen

## 2023-02-22 PROBLEM — M25.561 RIGHT KNEE PAIN: Status: ACTIVE | Noted: 2023-02-22

## 2023-03-02 ENCOUNTER — OFFICE VISIT (OUTPATIENT)
Dept: OPTOMETRY | Facility: CLINIC | Age: 48
End: 2023-03-02
Payer: MEDICAID

## 2023-03-02 DIAGNOSIS — H52.213 IRREGULAR ASTIGMATISM OF BOTH EYES: ICD-10-CM

## 2023-03-02 DIAGNOSIS — H25.13 NUCLEAR SCLEROSIS OF BOTH EYES: Primary | ICD-10-CM

## 2023-03-02 PROCEDURE — 92014 PR EYE EXAM, EST PATIENT,COMPREHESV: ICD-10-PCS | Mod: S$PBB,,, | Performed by: OPTOMETRIST

## 2023-03-02 PROCEDURE — 92014 COMPRE OPH EXAM EST PT 1/>: CPT | Mod: S$PBB,,, | Performed by: OPTOMETRIST

## 2023-03-02 PROCEDURE — 99213 OFFICE O/P EST LOW 20 MIN: CPT | Mod: PBBFAC | Performed by: OPTOMETRIST

## 2023-03-02 PROCEDURE — 92015 PR REFRACTION: ICD-10-PCS | Mod: ,,, | Performed by: OPTOMETRIST

## 2023-03-02 PROCEDURE — 92015 DETERMINE REFRACTIVE STATE: CPT | Mod: ,,, | Performed by: OPTOMETRIST

## 2023-03-02 PROCEDURE — 99999 PR PBB SHADOW E&M-EST. PATIENT-LVL III: CPT | Mod: PBBFAC,,, | Performed by: OPTOMETRIST

## 2023-03-02 PROCEDURE — 99999 PR PBB SHADOW E&M-EST. PATIENT-LVL III: ICD-10-PCS | Mod: PBBFAC,,, | Performed by: OPTOMETRIST

## 2023-03-20 NOTE — PROGRESS NOTES
HPI    48 Y/o female is here for routine eye exam with C/o recheck OD   Pt denies pain and discomfort. Pt mentions that previous eye doctor said   she would do well with hard contact lenses but states he did not diagnose   her with keratoconus to her knowledge.   No f/f  Eye med: Pred OD tid and Latanaprost OD QHS while on pred (pt has finished   both gtts)  Last edited by Rosmery Gotti, OD on 3/20/2023  4:30 PM.            Assessment /Plan     For exam results, see Encounter Report.    Nuclear sclerosis of both eyes    Irregular astigmatism of both eyes      1. Educated pt on findings. Not visually significant. No need for removal at this time. Monitor yearly.    2. 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 Pt to return in 1 month for further eval with Topography and VA check thru new specs.    Eyeglass Final Rx       Eyeglass Final Rx         Sphere Cylinder Axis Dist VA Add    Right -3.25 +3.75 095 20/30+2 +1.50    Left -1.25 Sphere  20/30+1 +1.50      Type: Bifocal    Expiration Date: 3/2/2024                     RTC x 1 month for follow up

## 2023-04-13 ENCOUNTER — PATIENT MESSAGE (OUTPATIENT)
Dept: OPTOMETRY | Facility: CLINIC | Age: 48
End: 2023-04-13
Payer: MEDICAID